# Patient Record
(demographics unavailable — no encounter records)

---

## 2025-03-10 NOTE — CONSULT LETTER
[Dear  ___] : Dear  [unfilled], [Consult Letter:] : I had the pleasure of evaluating your patient, [unfilled]. [Please see my note below.] : Please see my note below. [Consult Closing:] : Thank you very much for allowing me to participate in the care of this patient.  If you have any questions, please do not hesitate to contact me. [Sincerely,] : Sincerely, [FreeTextEntry3] : Divya Navarro MD [facility] at  Otolaryngology  Head & Neck Surgery [facility]   Department of Otolaryngology [facility] at [facility]  Office Tel: [phone]

## 2025-03-10 NOTE — ASSESSMENT
[FreeTextEntry1] : He has a history of chronic rhinosinusitis.  He has findings consistent with acute infection.  He also has a deviated septum and inferior turban hypertrophy.  A culture was taken  Plan -Findings and management options were discussed with the patient. - Nasal saline irrigations, nasal steroid spray, antihistamine or decongestant as needed - I am placing him on a course of Augmentin and prednisone.  He has taken steroids in the past and is aware of the side effects.  I have asked him to consider taking Pepcid and avoid alcohol and exercise - Continue with management of allergies - I am sending him for CT scan of the sinuses for further evaluation.  He may benefit from sinus surgery - Reflux precautions recommended - He was asked to avoid smoking and vaping any substances - Follow-up after the CT scan - Call and return earlier if any problems or worsening symptoms

## 2025-03-10 NOTE — PHYSICAL EXAM
[TextEntry] : PHYSICAL EXAM  General: The patient was alert, oriented and in no distress. Voice was clear. He sounded congested  Face: The patient had no facial asymmetry or mass. The skin was unremarkable. Had mild erythema of the upper eyelids  Ears: Hearing normal to conversational voice External ears were normal without deformity. Ear canals were clear. No cerumen or inflammation Tympanic membranes were intact and normal. No perforation or effusion. mobile  Nose:  The external nose had no significant deformity.. On anterior rhinoscopy, the nasal mucosa was dry.  The anterior septum was grossly midline.   Oral cavity: Oral mucosa- normal. Oral and base of tongue- clear and without mass. Gingival and buccal mucosa- moist and without lesions. Palate- the palate moved well. There was no cleft palate. There appeared to be good salivary flow.   Oral cavity/oropharynx- no pus, erythema or mass  Neck:  The neck was symmetrical. The parotid and submandibular glands were normal without masses. The trachea was midline and there was no unusual crepitus. Thyroid was smooth and nontender and no masses were palpated. No masses  Lymphatics: Cervical adenopathy- none.    PROCEDURE:  NASAL ENDOSCOPY   Surgeon: Dr. Navarro Indication: He has a history of chronic rhinosinusitis.  He has an acute infection, inadequate exam on anterior rhinoscopy Anesthetic: Topical lidocaine and Afrin Procedure: The patient was placed in a sitting position.  Following application of the topical anesthetic and decongestant, exam was performed with a flexible telescope.  The scope was passed along the right nasal floor to the nasopharynx.  It was then passed into the region of the middle meatus, middle turbinate, and sphenoethmoid region.  An identical procedure was performed on the left side.  The following findings were noted:  Nasal mucosa: Dry anteriorly and edematous throughout Septum: Deviated  Right nasal cavity-purulent drainage along the floor of the nose      Inferior turbinate: Hypertrophic      Middle turbinate: normal      Superior turbinate: normal      Inferior meatus: Small amount of drainage.      Middle meatus: Drainage.  Culture taken      Superior meatus: Edematous      Sphenoethmoidal recess: Edematous  Left nasal cavity      Inferior turbinate: Hypertrophic      Middle turbinate: normal      Superior turbinate: normal      Inferior meatus: no pus, polyps or congestion      Middle meatus: Edema and stringy yellow mucus      Superior meatus: Edematous      Sphenoethmoidal recess: Edematous  Nasopharynx: no masses, choanae patent, no adenoid tissue

## 2025-03-19 NOTE — HISTORY OF PRESENT ILLNESS
[de-identified] : - 3/10/25- CHERYL CHAMBERLAIN is a 25 year old patient  referred by Dr. Hodge for nasal congestion. With a long history of chronic rhinosinusitis.  He has had worsening and severe symptoms since November.  He has sinus pain, nasal congestion, nasal obstruction, and occasional bleeding from the nose.  His symptoms tend to be worse after he flies.  He was on antibiotics and steroids in November with temporary relief.  He had an infection about 2 weeks ago and is given steroids for pulmonary symptoms.  He is also using an inhaler.  He also said that he gets erythema around his eyes.  This can occur whether or not he has a sinus infection.  He does not have visual changes, blurry vision, or double vision.  He does see an allergist.  He saw ENT within the past year in [place].  He said he had a CT scan.  There may have been some inflammation.   Sinus medications:  He has tried multiple medications including nasal saline irrigations, nasal steroid sprays, antihistamines, antibiotics and steroids.  He has tried nasal saline rinses for at least 6 weeks.  He has temporary relief with the medications.   History of frequent or recurrent sinus infections: frequent infections   Allergy testing:  positive. he had immunotherapy when younger. He was on dupixent for  2 years. He is currently [facility] Nasal/sinus surgery: no Nasal trauma: no Reflux or throat symptoms: history of IBS. some reflux History of headaches or TMJD: possible TMJD. no tension headaches or migraines   Pets at home: no Smoking history: vapes marijuana. no tobacco - [FreeTextEntry1] : - 3/19/25- He is here for follow-up for sinusitis.  His culture was positive for strep pneumoniae.  He has been on antibiotics and steroids.  He was having throbbing headaches with sinus pressure over his temples.  He went to the ER on Saturday.  He had a CT scan at [facility].  I was able to review the images as well as the report.  It showed a deviated septum with sinus inflammation of the maxillary sinuses.  The left side was worse.  There was obstruction of the OMCs.  The rest of the sinuses were essentially clear.  The headaches are better.  The erythema around the eyes has also improved.  He has had frequent infections which recur following treatment since at least November.

## 2025-03-19 NOTE — CONSULT LETTER
[Dear  ___] : Dear  [unfilled], [Consult Letter:] : I had the pleasure of evaluating your patient, [unfilled]. [Please see my note below.] : Please see my note below. [Consult Closing:] : Thank you very much for allowing me to participate in the care of this patient.  If you have any questions, please do not hesitate to contact me. [Sincerely,] : Sincerely, [FreeTextEntry3] : Divya Navarro MD [facility] at [facility] Otolaryngology  Head & Neck Surgery [facility]   Department of Otolaryngology [facility] at [facility]  Office Tel: [phone]

## 2025-03-19 NOTE — PHYSICAL EXAM
[TextEntry] : PHYSICAL EXAM  General: The patient was alert, oriented and in no distress. Voice was clear.   Face: The patient had no facial asymmetry or mass. The skin was unremarkable.   Ears: Hearing normal to conversational voice External ears were normal without deformity. Ear canals were clear. No cerumen or inflammation Tympanic membranes were intact and normal. No perforation or effusion. mobile  Nose:  The external nose had no significant deformity.. On anterior rhinoscopy, the nasal mucosa was clear.  The anterior septum was grossly midline.   Oral cavity: Oral mucosa- normal. Oral and base of tongue- clear and without mass. Gingival and buccal mucosa- moist and without lesions. Palate- the palate moved well. There was no cleft palate. There appeared to be good salivary flow.   Oral cavity/oropharynx- no pus, erythema or mass  Neck:  The neck was symmetrical. The parotid and submandibular glands were normal without masses. The trachea was midline and there was no unusual crepitus. Thyroid was smooth and nontender and no masses were palpated. No masses  Lymphatics: Cervical adenopathy- none.  PROCEDURE:  NASAL ENDOSCOPY   Surgeon: Dr. Navarro Indication: He has a history of chronic rhinosinusitis, assess for a persistent infection, inadequate exam on anterior rhinoscopy Anesthetic: Topical lidocaine and Afrin Procedure: The patient was placed in a sitting position.  Following application of the topical anesthetic and decongestant, exam was performed with a flexible telescope.  The scope was passed along the right nasal floor to the nasopharynx.  It was then passed into the region of the middle meatus, middle turbinate, and sphenoethmoid region.  An identical procedure was performed on the left side.  The following findings were noted:  Nasal mucosa:  mild edema Septum: Deviated to the left  Right nasal cavity-stringy clear mucous      Inferior turbinate: Hypertrophic      Middle turbinate: normal      Superior turbinate: normal      Inferior meatus: no pus, polyps or congestion      Middle meatus: no pus, polyps or congestion      Superior meatus: no pus, polyps or congestion      Sphenoethmoidal recess: no pus, polyps or congestion  Left nasal cavity- scant stringy clear mucous      Inferior turbinate: Hypertrophic      Middle turbinate: normal      Superior turbinate: normal      Inferior meatus: no pus, polyps or congestion      Middle meatus: no pus, polyps or congestion      Superior meatus: no pus, polyps or congestion      Sphenoethmoidal recess: no pus, polyps or congestion  Nasopharynx: no masses, choanae patent, no adenoid tissue

## 2025-03-19 NOTE — ASSESSMENT
[FreeTextEntry1] : He has a history of chronic rhinosinusitis with recurrent sinus infections, a deviated septum, and inferior turbinate hypertrophy.  The infection is resolving.  He is feeling better.  The headaches have improved.  His exam also shows improvement.  There is a little bit of stringy clear mucus bilaterally.  There is no purulent drainage or congestion.  I reviewed his recent CT scan.  Plan -Findings and management options were discussed with the patient. - Continue nasal saline irrigations and nasal steroid spray - Antihistamine or decongestant as needed - I am going to extend the antibiotics for another few days - Continue with management of allergies - We spoke about possible surgery.  He is a candidate for image guided endoscopic sinus surgery with septoplasty and bilateral inferior turbinate reduction.  I reviewed the procedure and the risks/benefits of the options. The risks were discussed in detail and include but are not limited to anesthesia complications, bleeding, infection, worsening or persistent symptoms, septal perforation, change in smell/taste, numbness of teeth, scarring, need for further procedures, headaches/facial and nasal pain, CSF leak/skull base injury and visual changes/orbital injury. The expected postoperative course was reviewed.  The importance of postoperative care was also discussed.  We did discuss in office procedure such as balloon sinuplasty with inferior turbinate reduction.  However, due to the deviated septum, I think balloon sinuplasty would be difficult.  He is going to let me know if he would like to proceed with surgery - He will call me if he would like to proceed with surgery.  I will see him back if his current symptoms worsen or do not improve.  If he opts for observation, I will have him follow-up in a few months.